# Patient Record
Sex: FEMALE | Race: BLACK OR AFRICAN AMERICAN | NOT HISPANIC OR LATINO | Employment: OTHER | URBAN - METROPOLITAN AREA
[De-identification: names, ages, dates, MRNs, and addresses within clinical notes are randomized per-mention and may not be internally consistent; named-entity substitution may affect disease eponyms.]

---

## 2019-06-28 PROBLEM — N17.9 AKI (ACUTE KIDNEY INJURY): Status: ACTIVE | Noted: 2019-04-16

## 2019-06-28 PROBLEM — H35.81 CLINICALLY SIGNIFICANT MACULAR EDEMA: Status: ACTIVE | Noted: 2019-06-28

## 2019-06-28 PROBLEM — K68.3 RETROPERITONEAL HEMATOMA: Status: ACTIVE | Noted: 2019-04-17

## 2019-06-28 PROBLEM — I36.1 NON-RHEUMATIC TRICUSPID VALVE INSUFFICIENCY: Status: ACTIVE | Noted: 2019-06-24

## 2019-06-28 PROBLEM — D63.8 ANEMIA OF CHRONIC DISEASE: Status: ACTIVE | Noted: 2017-06-01

## 2019-06-28 PROBLEM — J90 PLEURAL EFFUSION: Status: ACTIVE | Noted: 2019-06-28

## 2019-06-28 PROBLEM — I50.22 CHF (CONGESTIVE HEART FAILURE), NYHA CLASS III, CHRONIC, SYSTOLIC: Status: ACTIVE | Noted: 2019-04-21

## 2019-06-28 PROBLEM — I31.39 PERICARDIAL EFFUSION: Status: ACTIVE | Noted: 2019-06-28

## 2019-06-28 PROBLEM — I50.9 CHF (CONGESTIVE HEART FAILURE): Status: ACTIVE | Noted: 2017-11-02

## 2019-06-28 PROBLEM — E11.3599 PROLIFERATIVE DIABETIC RETINOPATHY ASSOCIATED WITH TYPE 2 DIABETES MELLITUS: Status: ACTIVE | Noted: 2019-06-28

## 2019-06-28 PROBLEM — E11.21 NEPHROTIC SYNDROME DUE TO DIABETES MELLITUS: Status: ACTIVE | Noted: 2017-06-16

## 2019-06-28 PROBLEM — E11.311: Status: ACTIVE | Noted: 2019-06-28

## 2019-06-28 PROBLEM — G89.29 CHRONIC BILATERAL LOW BACK PAIN WITHOUT SCIATICA: Status: ACTIVE | Noted: 2017-05-15

## 2019-06-28 PROBLEM — M54.50 CHRONIC BILATERAL LOW BACK PAIN WITHOUT SCIATICA: Status: ACTIVE | Noted: 2017-05-15

## 2019-06-28 PROBLEM — I48.0 PAROXYSMAL ATRIAL FIBRILLATION: Status: ACTIVE | Noted: 2019-06-28

## 2019-06-28 PROBLEM — N28.89 RENAL HEMORRHAGE, LEFT: Status: ACTIVE | Noted: 2019-03-31

## 2019-06-28 PROBLEM — E11.3419 SEVERE NONPROLIFERATIVE DIABETIC RETINOPATHY WITH MACULAR EDEMA ASSOCIATED WITH TYPE 2 DIABETES MELLITUS: Status: ACTIVE | Noted: 2017-11-21

## 2019-06-28 PROBLEM — E10.29: Status: ACTIVE | Noted: 2018-01-30

## 2019-08-16 ENCOUNTER — TELEPHONE (OUTPATIENT)
Dept: TRANSPLANT | Facility: CLINIC | Age: 54
End: 2019-08-16

## 2019-08-16 DIAGNOSIS — I50.9 CONGESTIVE HEART FAILURE, UNSPECIFIED HF CHRONICITY, UNSPECIFIED HEART FAILURE TYPE: Primary | ICD-10-CM

## 2019-08-16 NOTE — TELEPHONE ENCOUNTER
Records from referring provider scanned into [employer] and sent to be scanned into [employer]. Called to schedule consult appointment. No answer. Left message along with contact information to please call back.

## 2019-09-03 NOTE — TELEPHONE ENCOUNTER
Called to schedule consult appointment. No answer. Left message along with contact information to please call back.

## 2019-09-06 NOTE — TELEPHONE ENCOUNTER
Called to schedule consult appointment. Left message with person answering along with my contact information to please have patient return call.

## 2019-09-17 NOTE — TELEPHONE ENCOUNTER
REFERRAL NOTE:    Eric Powell has been referred to the pre-heart transplant office for consideration for orthotopic heart transplantation by Mesfin Billings. Patient's appointments have been scheduled for 10/01/19. Sooner appointment was offered. Information was provided and questions were answered. [facility]/ Transplant Handout, appointment letter and campus map was mailed to the patient. My contact information was given. Pleasant. Call PRN.

## 2019-09-25 NOTE — TELEPHONE ENCOUNTER
Spoke to Taniya with Dr Billings's office. Informed of appointment as scheduled with Dr Guerin. Understanding verbalized.

## 2019-10-04 ENCOUNTER — TELEPHONE (OUTPATIENT)
Dept: TRANSPLANT | Facility: CLINIC | Age: 54
End: 2019-10-04

## 2019-10-04 NOTE — TELEPHONE ENCOUNTER
"Call placed to f/u missed consult appt; spoke with presumed family member who reported that pt was in hospital for "heart surgery" that was performed this week.  It was advised that patient does plan to come here for consult eventually.  Requested that patient call to reschedule when ready.  Understanding verbalized.  "

## 2019-10-17 ENCOUNTER — TELEPHONE (OUTPATIENT)
Dept: TRANSPLANT | Facility: CLINIC | Age: 54
End: 2019-10-17

## 2019-10-17 NOTE — TELEPHONE ENCOUNTER
Called to check on and schedule consult appointment. Patient answering stated patient is still in the hospital.

## 2019-10-29 ENCOUNTER — TELEPHONE (OUTPATIENT)
Dept: TRANSPLANT | Facility: CLINIC | Age: 54
End: 2019-10-29

## 2019-10-29 ENCOUNTER — DOCUMENTATION ONLY (OUTPATIENT)
Dept: TRANSPLANT | Facility: CLINIC | Age: 54
End: 2019-10-29

## 2019-10-29 NOTE — PROGRESS NOTES
Scheduling request placed for initial consult visit as patient has reportedly been in hospital and unable to attend previously scheduled visits.

## 2019-10-29 NOTE — TELEPHONE ENCOUNTER
----- Message from Kami Swan RN sent at 10/29/2019 10:48 AM CDT -----  Good morning,  Will you please reach out to patient again about rescheduling consult visits?    Kami

## 2020-04-23 ENCOUNTER — DOCUMENTATION ONLY (OUTPATIENT)
Dept: TRANSPLANT | Facility: CLINIC | Age: 55
End: 2020-04-23

## 2020-04-23 NOTE — PROGRESS NOTES
Reviewed case with Dr. Burns.  Referral closed as pt was no show for consult appts in October and has been unreachable.